# Patient Record
Sex: MALE | Race: WHITE | URBAN - METROPOLITAN AREA
[De-identification: names, ages, dates, MRNs, and addresses within clinical notes are randomized per-mention and may not be internally consistent; named-entity substitution may affect disease eponyms.]

---

## 2017-01-05 ENCOUNTER — HISTORICAL (OUTPATIENT)
Dept: ADMINISTRATIVE | Facility: HOSPITAL | Age: 57
End: 2017-01-05

## 2019-03-29 ENCOUNTER — HISTORICAL (OUTPATIENT)
Dept: ADMINISTRATIVE | Facility: HOSPITAL | Age: 59
End: 2019-03-29

## 2019-03-29 LAB
ABS NEUT (OLG): 5.61 X10(3)/MCL (ref 2.1–9.2)
ALBUMIN SERPL-MCNC: 3.4 GM/DL (ref 3.4–5)
ALBUMIN/GLOB SERPL: 0.8 RATIO (ref 1.1–2)
ALP SERPL-CCNC: 69 UNIT/L (ref 46–116)
ALT SERPL-CCNC: 17 UNIT/L (ref 12–78)
AST SERPL-CCNC: 18 UNIT/L (ref 15–37)
BASOPHILS # BLD AUTO: 0 X10(3)/MCL (ref 0–0.2)
BASOPHILS NFR BLD AUTO: 0 %
BILIRUB SERPL-MCNC: 0.1 MG/DL (ref 0.2–1)
BILIRUBIN DIRECT+TOT PNL SERPL-MCNC: <0.05 MG/DL (ref 0–0.2)
BILIRUBIN DIRECT+TOT PNL SERPL-MCNC: >0.05 MG/DL (ref 0–0.8)
BNP BLD-MCNC: 66 PG/ML (ref 0–125)
BUN SERPL-MCNC: 20 MG/DL (ref 7–18)
CALCIUM SERPL-MCNC: 8.7 MG/DL (ref 8.5–10.1)
CHLORIDE SERPL-SCNC: 98 MMOL/L (ref 98–107)
CO2 SERPL-SCNC: 31.1 MMOL/L (ref 21–32)
CREAT SERPL-MCNC: 1.35 MG/DL (ref 0.6–1.3)
EOSINOPHIL # BLD AUTO: 0.3 X10(3)/MCL (ref 0–0.9)
EOSINOPHIL NFR BLD AUTO: 2 %
ERYTHROCYTE [DISTWIDTH] IN BLOOD BY AUTOMATED COUNT: 14.6 % (ref 11.5–17)
GLOBULIN SER-MCNC: 4.3 GM/DL (ref 2.4–3.5)
GLUCOSE SERPL-MCNC: 219 MG/DL (ref 74–106)
HCT VFR BLD AUTO: 38.8 % (ref 42–52)
HGB BLD-MCNC: 13.2 GM/DL (ref 14–18)
IMM GRANULOCYTES # BLD AUTO: 0.07 % (ref 0–0.02)
IMM GRANULOCYTES NFR BLD AUTO: 0.6 % (ref 0–0.43)
LYMPHOCYTES # BLD AUTO: 4.7 X10(3)/MCL (ref 0.6–4.6)
LYMPHOCYTES NFR BLD AUTO: 40 %
MCH RBC QN AUTO: 30.4 PG (ref 27–31)
MCHC RBC AUTO-ENTMCNC: 34 GM/DL (ref 33–36)
MCV RBC AUTO: 89.4 FL (ref 80–94)
MONOCYTES # BLD AUTO: 1 X10(3)/MCL (ref 0.1–1.3)
MONOCYTES NFR BLD AUTO: 8 %
NEUTROPHILS # BLD AUTO: 5.61 X10(3)/MCL (ref 1.4–7.9)
NEUTROPHILS NFR BLD AUTO: 48 %
PLATELET # BLD AUTO: 421 X10(3)/MCL (ref 130–400)
PMV BLD AUTO: 9 FL (ref 9.4–12.4)
POTASSIUM SERPL-SCNC: 3.4 MMOL/L (ref 3.5–5.1)
PROT SERPL-MCNC: 7.7 GM/DL (ref 6.4–8.2)
RBC # BLD AUTO: 4.34 X10(6)/MCL (ref 4.7–6.1)
SODIUM SERPL-SCNC: 137 MMOL/L (ref 136–145)
WBC # SPEC AUTO: 11.7 X10(3)/MCL (ref 4.5–11.5)

## 2020-01-01 ENCOUNTER — HISTORICAL (OUTPATIENT)
Dept: ADMINISTRATIVE | Facility: HOSPITAL | Age: 60
End: 2020-01-01

## 2021-01-01 ENCOUNTER — HISTORICAL (OUTPATIENT)
Dept: ADMINISTRATIVE | Facility: HOSPITAL | Age: 61
End: 2021-01-01

## 2022-09-13 NOTE — HISTORICAL [facility] CERNER
This is a historical note converted from [employer]. Formatting and pictures may have been removed.  Please reference [employer] for original formatting and attached multimedia. Chief Complaint  pt to ER via POV for eval. ?sent from infectious dz for eval, admit and treatment for bacterial infection. ?pt has trach, sat noted to be in 80s, NAD  Reason for Consultation  leaking around TEP  History of Present Illness  This is a 60 year old male with a history of laryngeal cancer post total laryngectomy in 2019 with Dr. Martini. He underwent tracheoesophageal puncture for voice restoration. Over the weekend, I believe he was cleaning his prosthesis when it fell out. He had a red rubber catheter placed with plans to have a new TEP placed with his Speech Pathologist yesterday. In the interim, he was admitted to [facility] for MSSA bacteremia likely from Mediport?with failed attempted outpatient treatment. He was admitted and started on IV antibiotics. He underwent swallow evaluation on admission and there was a concern about liquids leaking around his TEP site. ENT was consulted for evaluation.  Review of Systems  Constitutional - Denies  Eye - Denies  HENT - Denies  Respiratory - Denies  Cardiovascular - Denies  Gastrointestinal - Denies  Genitourinary - Denies  Heme/Lymph ?- Denies  Endocrine ?- Denies  Immunologic ?- Denies  Musculoskeletal ?- Denies  Integumentary ?- Denies  Neurologic ?- Denies  All Other ROS negative  Physical Exam  Vitals & Measurements  T:?36.9? ?C (Oral)? TMIN:?36.8? ?C (Oral)? TMAX:?37.1? ?C (Oral)? HR:?68(Peripheral)? RR:?18? BP:?113/72? SpO2:?98%?  General - no acute distress, alert/oriented, aphonic  Eye - extraocular movements intact bilaterally, pupils equal round and reactive to light and accommodation  Head - normocephalic, atraumatic  Ears - externally normal  Nose - bilateral nares patent  Oral cavity/oropharynx - mucosal membranes moist  Neck - stoma is patent, red rubber catheter in TEP site with  appropriate fit  Respiratory - nonlabored breathing  Assessment/Plan  60 year old male with history of laryngeal cancer post total laryngectomy, admitted for bacteremia, with leakage around tracheoesophageal puncture site  ?  Recommend follow up with his Speech Pathologist as an?outpatient promptly after discharge to have his TEP replaced. I would not recommend replacing his red rubber catheter with a larger size due to risk of stretching his puncture site. He will probably have some leakage of thin liquids until the new prosthesis is placed. For nutrition in the meantime I think he should be okay to swallow more solid foods and possibly thickened liquids. He can also be fed liquids through his red rubber catheter.?I will reach out to the Speech Pathologist involved in his case tomorrow to see what her thoughts are. Also will check TSH level to make sure hypothyroidism not contributing to leakage and prosthesis falling out.  ?  Jennifer العراقي MD   Problem List/Past Medical History  Ongoing  Acid reflux  Arthritis  Cataract  Chronic pain  Hypertension  Hyperthyroidism  Hypothyroidism  Laryngeal cancer  MSSA bacteremia  Sleep apnea  Historical  Diabetes  Diabetes  Hyperlipemia  Hypertension  Procedure/Surgical History  Biopsy, bone, trocar, or needle; superficial (eg, ilium, sternum, spinous process, ribs) (01/11/2021)  Excision of Right Pelvic Bone, Percutaneous Approach, Diagnostic (01/11/2021)  Feeding tube (08/13/2020)  Laryngectomy (09/05/2019)  Lymph Nodes Resection (09/05/2019)  64539 - LT CATARACT W/IOL 1 STA PHACO (Left) (09/05/2018)  Extracapsular cataract removal with insertion of intraocular lens prosthesis (1 stage procedure), manual or mechanical technique (eg, irrigation and aspiration or phacoemulsification) (09/05/2018)  Replacement of Left Lens with Synthetic Substitute, Percutaneous Approach (09/05/2018)  Repair Right Hand Skin, External Approach (01/11/2017)  Simple repair of superficial wounds of  scalp, neck, axillae, external genitalia, trunk and/or extremities (including hands and feet); 2.5 cm or less (01/11/2017)  back surgery  Biopsy of bone  Eye  eye surgery left eye (trauma)  Hernia  herniaplasty x 3 (groin)  History of back surgery  Mediport Placement  Right eye Cataract   Medications  Inpatient  Acetaminophen 1000 mg /100 mL Inf, 1000 mg= 100 mL, IV Piggyback, q6hr, PRN  Ancef, 2 gm= 50 mL, IV Piggyback, q8hr  Ativan, 2 mg= 1 mL, IV Push, q4hr, PRN  atorvastatin 40 mg oral tablet, 80 mg= 2 tab(s), Oral, Daily  CCA Normal Saline (0.9% NS) - 1000 mL, 2000 mL, IV Piggyback, Once-chemo  Clinimix Sulfite-Free 4.25% with 5% Dextrose 1,000 mL, 1000 mL, IV  Colace 100 mg oral capsule, 100 mg= 1 cap(s), Oral, BID  DIAZepam 5 mg oral tablet, 10 mg= 2 tab(s), Oral, BID  Dilaudid 2 mg/mL injectable solution, 2 mg= 1 mL, IV Push, q2hr, PRN  DuoNeb, 3 mL, NEB, q4hr, PRN  famotidine, 40 mg= 2 tab(s), Oral, Once a day (at bedtime)  Flomax 0.4 mg oral capsule, 0.4 mg= 1 cap(s), Oral, Daily  Intralipid 20% IVPB, 50 gm= 250 mL, IV Piggyback, At Bedtime  Keytruda 25 mg/mL intravenous solution, 200 mg= 8 mL, IV Piggyback, Once-chemo  Keytruda 25 mg/mL intravenous solution, 200 mg= 8 mL, IV Piggyback, Once-chemo  Keytruda 25 mg/mL intravenous solution, 200 mg= 8 mL, IV Piggyback, Once-chemo  Keytruda 25 mg/mL intravenous solution, 200 mg= 8 mL, IV Piggyback, Once-chemo  Keytruda 25 mg/mL intravenous solution, 200 mg= 8 mL, IV Piggyback, Once-chemo  Keytruda 25 mg/mL intravenous solution, 200 mg= 8 mL, IV Piggyback, Once-chemo  Keytruda 25 mg/mL intravenous solution  Keytruda 25 mg/mL intravenous solution, 200 mg= 8 mL, IV Piggyback, Once-chemo  Keytruda 25 mg/mL intravenous solution, 200 mg= 8 mL, IV Piggyback, Once-chemo  levothyroxine 125 mcg (0.125 mg) oral tablet, 125 mcg= 1 tab(s), Oral, Daily  morphine 4 mg/mL preservative-free intravenous solution, 4 mg= 1 mL, IV Push, q4hr, PRN  morphine 4 mg/mL preservative-free  intravenous solution, 2 mg= 0.5 mL, IV Push, q4hr, PRN  MS Contin, 15 mg= 1 tab(s), Oral, q12hr  pantoprazole, 40 mg= 1 tab(s), Oral, Daily  promethazine, 25 mg= 1 mL, IM, q4hr, PRN  senna 8.6 mg oral tablet, 8.6 mg= 1 tab(s), Oral, BID  sertraline, 100 mg= 2 tab(s), Oral, At Bedtime  Tylenol, 1000 mg= 2 tab(s), Oral, Once  Zofran, 4 mg= 2 mL, IV Push, q4hr, PRN  Home  albuterol 0.083% inhalation solution, 2.5 mg= 3 mL, NEB, q4-6hr  albuterol-ipratropium 2.5 mg-0.5 mg/3 mL inhalation solution, 3 mL, NEB, q4hr  cimetidine 400 mg oral tablet, 400 mg= 1 tab(s), Oral, BID  Colace 100 mg oral capsule, 100 mg= 1 cap(s), Oral, BID, 3 refills  DIAZEPAM 10 MG TABLET, 10 mg= 1 tab(s), Oral, BID  famotidine 40 mg oral tablet, 40 mg= 1 tab(s), Oral, Once a day (at bedtime)  Flomax 0.4 mg oral capsule, 0.4 mg= 1 cap(s), Oral, Daily  glimepiride 2 mg oral tablet, 2 mg= 1 tab(s), Oral, Daily  hydrochlorothiazide-lisinopril 12.5 mg-20 mg oral tablet, 1 tab(s), Oral, Daily,? ?Not taking  JANUVIA 100 MG TABLET, 100 mg= 1 tab(s), Oral, Daily  levothyroxine 125 mcg (0.125 mg) oral tablet, 125 mcg= 1 tab(s), Oral, Daily, 1 refills  levothyroxine 150 mcg (0.15 mg) oral tablet, 150 mcg= 1 tab(s), Oral, Daily  MS Contin 15 mg oral tablet, extended release, 15 mg= 1 tab(s), Oral, q12hr  Norco 10 mg-325 mg oral tablet, 1 tab(s), Oral, q6hr, PRN  PANTOPRAZOLE SOD DR 40 MG TAB, 40 mg= 1 tab(s), Oral, Daily  promethazine 6.25 mg/5 mL ORAL syrup, 25 mg= 20 mL, Oral, q4hr, PRN  ROSUVASTATIN CALCIUM 20 MG TAB, 20 mg= 1 tab(s), Oral, qPM  senna 8.6 mg oral tablet, 8.6 mg= 1 tab(s), Oral, BID, 3 refills  sertraline 100 mg oral tablet, 100 mg= 1 tab(s), Oral, At Bedtime  tamsulosin 0.4 mg oral capsule, 0.4 mg= 1 cap(s), Oral, Daily  Xarelto 20mg Tablet, 20 mg= 1 tab(s), Oral, qPM,? ?Not taking  Allergies  No Known Allergies  Social History  Abuse/Neglect  No, No, Yes, 04/06/2021  Alcohol - Denies Alcohol Use, 06/13/2012  Never, 04/06/2021  Substance  Use  Never, 04/06/2021  Tobacco - Denies Tobacco Use, 06/13/2012  Former smoker, quit more than 30 days ago, N/A, 04/06/2021  Family History  Family history is unknown

## 2022-09-13 NOTE — HISTORICAL [facility] CERNER
This is a historical note converted from [employer]. Formatting and pictures may have been removed.  Please reference [employer] for original formatting and attached multimedia. Chief Complaint  pt to ER via POV for eval. ?sent from infectious dz for eval, admit and treatment for bacterial infection. ?pt has trach, sat noted to be in 80s, NAD  History of Present Illness  Pt is a 60-year-old  male who presents to the ED sent by his ID specialist for bacteremia with failed outpatient treatment. ?Patient has a history of laryngeal cancer status post laryngectomy,?HTN, hypothyroidism,?DM, HLD, MSSA bacteremia. According to the medical record patient had outpatient blood cultures done which grew out MSSA in 2 out of 2 bottles.? Patient was started on oral Bactrim and referred for Mediport removal as this was thought to be the source of his infection. Pt is followed by Dr. Carson?with medical?oncology.? Pt has received chemotherapy and radiation therapy. It is reported pt still continues to run fever at home low grade temp 100F. He has been on oral antibiotics for about 2 weeks. He reports feeling tired and fatigued. He communicates well by writing on paper and gestures. Pt also has complaints of some neck and back pain. No reports of any injuries or falls. Labs revealed Potassium 5.3, BUN 28.1, creatinine 1.39; WBCs 19.2, H&H 11.1/34.6, platelets 581; other indices were?unremarkable.? COVID-19 rapid test was negative.? Chest x-ray revealed right upper lung lobe and left perihilar new masslike opacities.?Blood cultures were collected ? 2 sets.? Patient was started on Ancef 2 g IV piggyback every 8 hours per ID recommendations per ED provider.? [facility].??Patient?was very promptly?admitted to the?hospital medicine service.  Review of Systems  Except as documented, all other systems were reviewed and are negative  Physical Exam  Vitals & Measurements  T:?37.0? ?C  (Oral)? HR:?65(Peripheral)? HR:?65(Monitored)? RR:?14? BP:?114/64? SpO2:?96%?  General:?chronically ill-appearing and looks older than stated age; nontoxic, no family member at the bedside  Eye: PERRL,?clear conjunctiva  HENT:? oropharynx and nasal mucosal surfaces moist  Neck: laryngectomy stoma  Respiratory: diminished bilaterally, coarse breath sounds  Cardiovascular:?regular rate and rhythm  Gastrointestinal:?soft, non-tender, non-distended with normal bowel sounds  Musculoskeletal:?full range of motion of all extremities  Integumentary: warm, dry, intact  Neurologic: cranial nerves intact, no signs of peripheral neurological deficit, motor/sensory function intact  Psychiatric: cooperative, appropriate mood and affect  Cognition and Speech: non-verbal secondary to laryngectomy  Assessment/Plan  1.?MSSA bacteremia?R78.81  ?and sepsis  ?  2.?Laryngeal cancer?C32.0  Ordered:  ?  3.?Lung metastases?C78.00  ?newly diagnosed on CXR today  ?  4.?Tracheostomy complication?J95.00  ?  5.?Cancer related pain?G89.3  Ordered:  ?  6.?Chronic pain?G89.29  ?opiate dependent  ?  7.?Hypothyroidism?E03.9  ?  8.?Non-insulin treated type 2 diabetes mellitus?E11.9  ?hypoglycemia  ?  9.?GERD (gastroesophageal reflux disease)?K21.9  ?  10.?HLD (hyperlipidemia)?E78.5  ?  11.?Anxiety disorder 300.00?F41.9  ?  12.?Chronic respiratory failure with hypoxia?J96.11  ?  13.?BPH (benign prostatic hyperplasia)?N40.0  ?  14.?Hyperkalemia?E87.5  ?  15.?Anemia of chronic disease?D63.8  ?  16.?Oropharyngeal dysphagia?R13.12  ?  17.?DNR (do not resuscitate)?Z66  ?  ?  ?  Plan:  Vital signs every 4 hours. ?Follow cultures and sensitivities. ?Ancef?2 g every 8 hours per ID recommendations. Consult ID.? Fall precautions. IV hydration with Clinimix. Supplemental oxygen. Will get CT chest?due to?CXR findings. Labs in the am. Consult SLP. Resume appropriate home medication when/if safe for oral intake. GI/DVT prophylaxis.  ?   Source of history: patient,  medical record, ED provider  Present at bedside: staff  Referral source: ED  History limitation: clinical condition  ?   PCP:? ALEXIA BARROS, NP  ?   ADVANCED DIRECTIVES:yes  CODE STATUS: DNR/DNI  ?   I, Tammy PEDERSON, FNP, reviewed and discussed the case with Dr. Jose Elias Sanchez. See addendum for further per MD.?   Problem List/Past Medical History  Diabetes, GERD, arthritis, chronic pain, HTN, HLD, hypothyroidism, DANIELLA, MSSA bacteremia  Procedure/Surgical History  Biopsy, bone, trocar, or needle; superficial (eg, ilium, sternum, spinous process, ribs) (01/11/2021)  Excision of Right Pelvic Bone, Percutaneous Approach, Diagnostic (01/11/2021)  Feeding tube (08/13/2020)  Laryngectomy (09/05/2019)  Lymph Nodes Resection (09/05/2019)  33565 - LT CATARACT W/IOL 1 STA PHACO (Left) (09/05/2018)  Extracapsular cataract removal with insertion of intraocular lens prosthesis (1 stage procedure), manual or mechanical technique (eg, irrigation and aspiration or phacoemulsification) (09/05/2018)  Replacement of Left Lens with Synthetic Substitute, Percutaneous Approach (09/05/2018)  Repair Right Hand Skin, External Approach (01/11/2017)  Simple repair of superficial wounds of scalp, neck, axillae, external genitalia, trunk and/or extremities (including hands and feet); 2.5 cm or less (01/11/2017)  back surgery  Biopsy of bone  Eye  eye surgery left eye (trauma)  Hernia  herniaplasty x 3 (groin)  History of back surgery  Mediport Placement  Right eye Cataract   Medications  Home  albuterol 0.083% inhalation solution, 2.5 mg= 3 mL, NEB, q4-6hr  albuterol-ipratropium 2.5 mg-0.5 mg/3 mL inhalation solution, 3 mL, NEB, q4hr  Bactrim  mg-160 mg oral tablet, 1 tab(s), Oral, BID  cimetidine 400 mg oral tablet, 400 mg= 1 tab(s), Oral, BID  Colace 100 mg oral capsule, 100 mg= 1 cap(s), Oral, BID, 3 refills  DIAZEPAM 10 MG TABLET, 10 mg= 1 tab(s), Oral, BID  famotidine 40 mg oral tablet, 40 mg= 1 tab(s), Oral, Once a day (at  bedtime)  Flomax 0.4 mg oral capsule, 0.4 mg= 1 cap(s), Oral, Daily  glimepiride 2 mg oral tablet, 2 mg= 1 tab(s), Oral, Daily  hydrochlorothiazide-lisinopril 12.5 mg-20 mg oral tablet, 1 tab(s), Oral, Daily,? ?Not taking  JANUVIA 100 MG TABLET, 100 mg= 1 tab(s), Oral, Daily  levothyroxine 125 mcg (0.125 mg) oral tablet, 125 mcg= 1 tab(s), Oral, Daily, 1 refills  levothyroxine 150 mcg (0.15 mg) oral tablet, 150 mcg= 1 tab(s), Oral, Daily  MS Contin 15 mg oral tablet, extended release, 15 mg= 1 tab(s), Oral, q12hr  Norco 10 mg-325 mg oral tablet, 1 tab(s), Oral, q6hr, PRN  PANTOPRAZOLE SOD DR 40 MG TAB, 40 mg= 1 tab(s), Oral, Daily  promethazine 6.25 mg/5 mL ORAL syrup, 25 mg= 20 mL, Oral, q4hr, PRN  ROSUVASTATIN CALCIUM 20 MG TAB, 20 mg= 1 tab(s), Oral, qPM  senna 8.6 mg oral tablet, 8.6 mg= 1 tab(s), Oral, BID, 3 refills  sertraline 100 mg oral tablet, 100 mg= 1 tab(s), Oral, At Bedtime  tamsulosin 0.4 mg oral capsule, 0.4 mg= 1 cap(s), Oral, Daily  Xarelto 20mg Tablet, 20 mg= 1 tab(s), Oral, qPM,? ?Not taking  Allergies  No Known Allergies  Social History  Abuse/Neglect  No, No, Yes, 04/06/2021  Alcohol - Denies Alcohol Use, 06/13/2012  Never, 04/06/2021  Substance Use  Never, 04/06/2021  Tobacco - Denies Tobacco Use, 06/13/2012  Former smoker, quit more than 30 days ago, N/A, 04/06/2021  Family History  Family history is unknown  Reviewed but negative in regards to HPI.  Immunizations  UTD  Lab Results  Group Detail Date Value w/Units Flags Normal Range Normal Reference Text Comment Ind   Serology Rapid/Other Micro COVID-19 Rapid 4/6/2021 12:28:00 CDT NEGATIVE? ? ? ? ?   Routine Chemistry Sodium Lvl 4/6/2021 11:46:00  mmol/L ? 136-145 ? ?   Routine Chemistry Potassium Lvl 4/6/2021 11:46:00 CDT 5.3 mmol/L HI 3.5-5.1 ? ?   Routine Chemistry Chloride 4/6/2021 11:46:00 CDT 97 mmol/L LOW  ? ?   Routine Chemistry CO2 4/6/2021 11:46:00 CDT 30 mmol/L ? 23-31 ? ?   Routine Chemistry Calcium Lvl 4/6/2021  11:46:00 CDT 9.4 mg/dL ? 8.8-10.0 ? ?   Routine Chemistry Glucose Lvl 4/6/2021 11:46:00 CDT 58 mg/dL LOW  ? ?   Routine Chemistry BUN 4/6/2021 11:46:00 CDT 28.1 mg/dL HI 8.4-25.7 ? ?   Routine Chemistry Creatinine 4/6/2021 11:46:00 CDT 1.39 mg/dL HI 0.73-1.18 ? ?   Routine Chemistry eGFR-AA 4/6/2021 11:46:00 CDT >60? NA ? ? ?   Routine Chemistry eGFR-LESLY 4/6/2021 11:46:00 CDT 55 mL/min/1.73 m2 NA ? ? ?   Routine Chemistry Bili Total 4/6/2021 11:46:00 CDT 0.1 mg/dL ? ? ? ?   Routine Chemistry Bili Direct 4/6/2021 11:46:00 CDT <0.1 mg/dL ? 0.0-0.5 ? ?   Routine Chemistry Bili Indirect 4/6/2021 11:46:00 CDT >0.00 mg/dL ? 0.00-0.80 ? ?   Routine Chemistry AST 4/6/2021 11:46:00 CDT 34 unit/L ? 5-34 ? ?   Routine Chemistry ALT 4/6/2021 11:46:00 CDT 13 unit/L ? 0-55 ? ?   Routine Chemistry Alk Phos 4/6/2021 11:46:00 CDT 82 unit/L ?  ? ?   Routine Chemistry Total Protein 4/6/2021 11:46:00 CDT 7.9 gm/dL HI 5.8-7.6 ? ?   Routine Chemistry Albumin Lvl 4/6/2021 11:46:00 CDT 3.1 gm/dL LOW 3.4-4.8 ? ?   Routine Chemistry Globulin 4/6/2021 11:46:00 CDT 4.8 gm/dL HI 2.4-3.5 ? ?   Routine Chemistry A/G Ratio 4/6/2021 11:46:00 CDT 0.6 ratio LOW 1.1-2.0 ? ?   Routine Chemistry Lactic Acid Lvl 4/6/2021 11:46:00 CDT 1.4 mmol/L ? 0.5-2.2 ? ?   Hematology WBC 4/6/2021 11:46:00 CDT 19.2 x10(3)/mcL HI 4.5-11.5 ? ?   Hematology RBC 4/6/2021 11:46:00 CDT 3.92 x10(6)/mcL LOW 4.70-6.10 ? ?   Hematology Hgb 4/6/2021 11:46:00 CDT 11.1 gm/dL LOW 14.0-18.0 ? ?   Hematology Hct 4/6/2021 11:46:00 CDT 34.6 % LOW 42.0-52.0 ? ?   Hematology Platelet 4/6/2021 11:46:00  x10(3)/mcL -400 ? ?   Hematology MCV 4/6/2021 11:46:00 CDT 88.3 fL ? 80.0-94.0 ? ?   Hematology MCH 4/6/2021 11:46:00 CDT 28.3 pg ? 27.0-31.0 ? ?   Hematology MCHC 4/6/2021 11:46:00 CDT 32.1 gm/dL LOW 33.0-36.0 ? ?   Hematology RDW 4/6/2021 11:46:00 CDT 15.3 % ? 11.5-17.0 ? ?   Hematology MPV 4/6/2021 11:46:00 CDT 9.4 fL ? 9.4-12.4 ? ?   Hematology Abs Neut  4/6/2021 11:46:00 CDT 16.73 x10(3)/mcL HI 2.10-9.20 ? ?   Hematology Neutro Auto 4/6/2021 11:46:00 CDT 87 % NA ? ? ?   Hematology Lymph Auto 4/6/2021 11:46:00 CDT 5 % NA ? ? ?   Hematology Mono Auto 4/6/2021 11:46:00 CDT 6 % NA ? ? ?   Hematology Eos Auto 4/6/2021 11:46:00 CDT 1 % NA ? ? ?   Hematology Abs Eos 4/6/2021 11:46:00 CDT 0.1 x10(3)/mcL ? 0.0-0.9 ? ?   Hematology Basophil Auto 4/6/2021 11:46:00 CDT 0 % NA ? ? ?   Hematology Abs Neutro 4/6/2021 11:46:00 CDT 16.73 x10(3)/mcL HI 2.10-9.20 ? ?   Hematology Abs Lymph 4/6/2021 11:46:00 CDT 1.0 x10(3)/mcL ? 0.6-4.6 ? ?   Hematology Abs [place] 4/6/2021 11:46:00 CDT 1.2 x10(3)/mcL ? 0.1-1.3 ? ?   Hematology Abs Baso 4/6/2021 11:46:00 CDT 0.0 x10(3)/mcL ? 0.0-0.2 ? ?   ?  ?  Diagnostic Results  Accession:?[id number]  Order:?XR Chest 1 View  Report Dt/Tm:?04/06/2021 11:38  Report:?  ?  CLINICAL: ?Chest pain.  ?  COMPARISON: March 29, 2019.  ?  FINDINGS: ?Cardiopericardial silhouette is within normal limits.  Within the right upper lung lobe is new masslike opacity which  measures 6.5 x 3.6 cm. There is also irregular defined opacification  in the left perihilar location. There is no pulmonary edema. No  significant fluid accumulation within the pleural spaces and no  pneumothorax.?  ?  IMPRESSION:  ?  Right upper lung lobe and left perihilar new masslike opacities.  Please consider further assessment with CT chest.      I, Vinay Sanchez MD, assumed care of this patient at?17:25 on 4/6/21.  ?  For this patient encounter, I reviewed the midlevel providers documentation, treatment plan, medical decision making and I had face-to-face time with this patient.  ?  History:  MSSA sepsis  ?  Physical exam:  above exam is my own  ?   Medical decision making:  above plan is my own  ?  ?   Critical care diagnosis: Sepsis requiring IV antibiotics; Dysphagia with hypoglycemia requiring IV PPN  Critical care intervention: Hands-on evaluation, review of  labs/radiographs/records and discussion with patient and staff.  Critical care time spent: 35 minutes   Also check an?echocardiogram to exclude endocarditis.

## 2022-09-13 NOTE — HISTORICAL [facility]ER
This is a historical note converted from Lani. Formatting and pictures may have been removed.  Please reference Lani for original formatting and attached multimedia. Admit and Discharge Dates  Admit Date: 04/06/2021  Discharge Date: 04/12/2021  Physicians  Attending Physician - christa DAVALOS/mireya DAVALOS  Admitting Physician - Laura DAVALOS, Vinay HOLBROOK  Consulting Physician - Florence DAVALOS, Kg  Consulting Physician - Malcom DAVALOS, Jennifer HEALY  Primary Care Physician - Jacy DAVALOS, Ezra  Discharge Diagnosis  1.?MSSA bacteremia?R78.81  ?- contiue bactrim ds? 1 po bid x 1 month/ follow up with ID as scheduled  ?  2.?Laryngeal cancer?C32.0  ?  3.?Lung metastases?C78.00  ?  4.?Tracheostomy complication?J95.00  ?  5.?Cancer related pain?G89.3  ?  6.?Chronic pain?G89.29  ?ms contin increased to 30 mgs po bid  ?  7.?Hypothyroidism?E03.9  ?-levothyroxine increased to 150 mcg? po daily sice tsh was elevated  ?  8.?Non-insulin treated type 2 diabetes mellitus?E11.9  ?-off all oral hypoglycemics  ?  9.?GERD (gastroesophageal reflux disease)?K21.9  ?  10.?HLD (hyperlipidemia)?E78.5  ?  11.?Anxiety disorder 300.00?F41.9  ?  12.?Chronic respiratory failure with hypoxia?J96.11  ?  13.?BPH (benign prostatic hyperplasia)?N40.0  ?  14.?Hyperkalemia?E87.5  ?-resolved  ?  15.?Anemia of chronic disease?D63.8  ?  16.?Oropharyngeal dysphagia?R13.12  ?-tolerating advanced dysphagia diet  ?  17.?DNR (do not resuscitate)?Z66  ?  Moderate protein-calorie malnutrition?E44.0  ??  Measurements:  Height:?170 cm (04/06/2021 15:45)?  Weight:?82.7 kg (04/06/2021 15:45)?  BMI:?28.62 kg/m2 (04/06/2021 15:45)?  ?  Malnutrition Chronic Illness??(04/12/2021 07:42)  Degree of Malnutrition:?Non-severe (moderate) malnutrition  Energy Intake:?Unable to evaluate  Interpretation of Weight Loss:?>10% in 6 months  Body Fat:?Mild  Areas of Body Fat Loss:?Triceps Skin Fold  Muscle Mass:?Mild  Areas of Muscle Mass Loss:?Clavicles (pectoralis & deltoids)  Fluid  Accumulation:?Does not meet criteria  Reduced  Strength:?Unable to evaluate  ?  Malnutrition Score:?0 (04/06/2021 15:45)?  ?  Patient has been screened and assessed by RD. See nutrition recommendations documented in Adult Nutrition Powerform. RD will follow patient.  ?  Admission Information  cc: Sent from infectious disease office for abnormal?labs.  ?   Medical problems: laryngeal cancer status post laryngectomy,?HTN, hypothyroidism,?DM, HLD, MSSA bacteremia. mediport removal secondary to infection  ?   Pt is a 60-year-old  male who presents to the ED sent by his ID specialist dr quintanilla?for bacteremia .??According to the medical record patient had outpatient blood cultures done which grew out MSSA in 2 out of 2 bottles.? Patient was started on oral Bactrim and referred for Mediport removal as this was thought to be the source of his infection, mediport removed. Pt is followed by Dr. Carson?with medical?oncology.? Pt has received chemotherapy and radiation therapy. It is reported pt still continues to run fevers at home low grade temp 100F. He has been on oral antibiotics for about 2 weeks. He reports feeling tired and fatigued. He communicates well by writing on paper and gestures. Pt also has complaints of some neck and back pain. No reports of any injuries or falls. Labs revealed Potassium 5.3, BUN 28.1, creatinine 1.39; WBCs 19.2, H&H 11.1/34.6, platelets 581; other indices were?unremarkable.? COVID-19 rapid test was negative.? Chest x-ray revealed right upper lung lobe and left perihilar new masslike opacities.?Blood cultures were collected ? 2 sets with finals being negative.? Patient was started on Ancef 2 g IV piggyback every 8 hours per ID recommendations per ED provider.? [facility].??Patient?was very promptly?admitted to the?hospital medicine service.  ?   ?We decided to consult ENT Dr. Rodriguez?Malcom?for evaluation?of Mr. Warner?after  his?prosthesis?post?track esophageal?puncture?for voice restoration?fell off.? ENT he gave recommendations?but recommended?for patient to see his?speech pathologist as soon as possible?after discharge?to have his?TEP placed, meantime a 14 Malay? red rubber cap use for aguilar.? Speech was consulted and pt was placed on a advanced dysphagia diet with no signs of aspiration. Good po intake.? Patient remained afebrile throughout admission.? Decision was done to continue Ancef?throughout the weekend?and discharge patient?today on Bactrim DS 1 p.o. twice daily x1 month.? Patient will follow-up with infectious disease as you?and he will follow-up with his?speech pathologist at [facility] ?for TEP replacement. case management was consulted for appt at [facility] since he missed appt since he was admitted.  ?  ?  Time Spent on discharge  Discharge summary greater than 35 minutes  Objective  Vitals & Measurements  T:?36.9? ?C (Oral)? TMIN:?36.6? ?C (Oral)? TMAX:?37.3? ?C (Oral)? HR:?68(Peripheral)? HR:?87(Monitored)? RR:?18? BP:?104/65? SpO2:?98%?  Physical Exam  General:?well-developed well-nourished in no acute distress  ?   Neurologic:??awake and alert, good insight,?speech is limited.  Normal gait. ?No gross?neurological deficits appreciated  ?   Eye:? clear conjunctiva, eyelids normal, no ptosis  HENT:?Laryngectomy stoma?with?red tube cath ?at TEP site, clean dry?no drainage present?no significant redness appreciated.  ?   Respiratory:?clear to auscultation bilaterally  Cardiovascular:?regular rate. ?No peripheral edema appreciated.  ?   Musculoskeletal:?full range of motion of all extremities/spine without limitation or discomfort  Patient Discharge Condition  stable  Discharge Disposition  [facility]   Discharge Medication Reconciliation  Prescribed  levothyroxine (levothyroxine 150 mcg (0.15 mg) oral tablet)?150 mcg, Oral, Daily  morphine (MS Contin 30 mg oral tablet, extended release)?30 mg, Oral,  q12hr  sulfamethoxazole-trimethoprim (Bactrim  mg-160 mg oral tablet)?1 tab(s), Oral, q12hr  Continue  acetaminophen-HYDROcodone (Norco 10 mg-325 mg oral tablet)?1 tab(s), Oral, q6hr, PRN pain, moderate  albuterol (albuterol 0.083% inhalation solution)?2.5 mg, NEB, q4-6hr  albuterol-ipratropium (albuterol-ipratropium 2.5 mg-0.5 mg/3 mL inhalation solution)?3 mL, NEB, q4hr  cimetidine (cimetidine 400 mg oral tablet)?400 mg, Oral, BID  diazepam (DIAZEPAM 10 MG TABLET)?10 mg, Oral, BID  docusate (Colace 100 mg oral capsule)?100 mg, Oral, BID  famotidine (famotidine 40 mg oral tablet)?40 mg, Oral, Once a day (at bedtime)  levothyroxine (levothyroxine 150 mcg (0.15 mg) oral tablet)?150 mcg, Oral, Daily  pantoprazole (PANTOPRAZOLE SOD DR 40 MG TAB)?40 mg, Oral, Daily  promethazine (promethazine 6.25 mg/5 mL ORAL syrup)?25 mg, Oral, q4hr, PRN as needed for nausea/vomiting  rosuvastatin (ROSUVASTATIN CALCIUM 20 MG TAB)?20 mg, Oral, qPM  senna (senna 8.6 mg oral tablet)?8.6 mg, Oral, BID  sertraline (sertraline 100 mg oral tablet)?100 mg, Oral, At Bedtime  tamsulosin (Flomax 0.4 mg oral capsule)?0.4 mg, Oral, Daily  tamsulosin (tamsulosin 0.4 mg oral capsule)?0.4 mg, Oral, Daily  Discontinue  glimepiride (glimepiride 2 mg oral tablet)?2 mg, Oral, Daily  hydrochlorothiazide-lisinopril (hydrochlorothiazide-lisinopril 12.5 mg-20 mg oral tablet)?1 tab(s), Oral, Daily  levothyroxine (levothyroxine 125 mcg (0.125 mg) oral tablet)?125 mcg, Oral, Daily  morphine (MS Contin 15 mg oral tablet, extended release)?15 mg, Oral, q12hr  rivaroxaban (Xarelto 20mg Tablet)?20 mg, Oral, qPM  sitaGLIPtin (JANUVIA 100 MG TABLET)?100 mg, Oral, Daily  Education and Orders Provided  IDDSI Mildy Thick Liquids (CUSTOM)  SLP Dysphagia Advanced (CUSTOM)  Discharge - 04/12/21 8:48:00 CDT, [facility]?  Follow up  [facility]  ????This is your current hospice. They will resume your care upon discharge back to home. You may  contact your provider for any questions or concerns regarding hospice services [phone].  Report to Emergency Department if symptoms return or worsen  Keep scheduled appointment  [employer]  No Qualifying Data